# Patient Record
Sex: FEMALE | NOT HISPANIC OR LATINO | URBAN - METROPOLITAN AREA
[De-identification: names, ages, dates, MRNs, and addresses within clinical notes are randomized per-mention and may not be internally consistent; named-entity substitution may affect disease eponyms.]

---

## 2021-05-01 ENCOUNTER — OFFICE VISIT (OUTPATIENT)
Dept: URBAN - METROPOLITAN AREA CLINIC 75 | Facility: CLINIC | Age: 61
End: 2021-05-01
Payer: COMMERCIAL

## 2021-05-01 DIAGNOSIS — H52.4 PRESBYOPIA: Primary | ICD-10-CM

## 2021-05-01 DIAGNOSIS — H25.13 AGE-RELATED NUCLEAR CATARACT, BILATERAL: ICD-10-CM

## 2021-05-01 DIAGNOSIS — H35.033 HYPERTENSIVE RETINOPATHY, BILATERAL: ICD-10-CM

## 2021-05-01 PROCEDURE — 92004 COMPRE OPH EXAM NEW PT 1/>: CPT | Performed by: OPTOMETRIST

## 2021-05-01 ASSESSMENT — VISUAL ACUITY
OS: 20/20
OD: 20/20

## 2021-05-01 ASSESSMENT — INTRAOCULAR PRESSURE
OD: 16
OS: 18

## 2021-05-01 NOTE — IMPRESSION/PLAN
Impression: Presbyopia: H52.4. Plan: Refraction completed. Updated glasses prescription dispensed to patient.